# Patient Record
Sex: MALE | Race: BLACK OR AFRICAN AMERICAN | Employment: UNEMPLOYED
[De-identification: names, ages, dates, MRNs, and addresses within clinical notes are randomized per-mention and may not be internally consistent; named-entity substitution may affect disease eponyms.]

---

## 2024-08-31 ENCOUNTER — HOSPITAL ENCOUNTER (EMERGENCY)
Facility: HOSPITAL | Age: 39
Discharge: HOME | End: 2024-09-01
Payer: COMMERCIAL

## 2024-08-31 DIAGNOSIS — R23.4 CRACKED SKIN ON FEET: ICD-10-CM

## 2024-08-31 DIAGNOSIS — R36.9 PENILE DISCHARGE: ICD-10-CM

## 2024-08-31 DIAGNOSIS — Z20.2 POSSIBLE EXPOSURE TO STD: Primary | ICD-10-CM

## 2024-08-31 PROCEDURE — 87661 TRICHOMONAS VAGINALIS AMPLIF: CPT

## 2024-08-31 PROCEDURE — 99284 EMERGENCY DEPT VISIT MOD MDM: CPT

## 2024-08-31 PROCEDURE — 87491 CHLMYD TRACH DNA AMP PROBE: CPT

## 2024-08-31 PROCEDURE — 87591 N.GONORRHOEAE DNA AMP PROB: CPT

## 2024-08-31 ASSESSMENT — PAIN DESCRIPTION - LOCATION: LOCATION: FOOT

## 2024-08-31 ASSESSMENT — PAIN - FUNCTIONAL ASSESSMENT: PAIN_FUNCTIONAL_ASSESSMENT: 0-10

## 2024-08-31 ASSESSMENT — PAIN SCALES - GENERAL: PAINLEVEL_OUTOF10: 10

## 2024-08-31 ASSESSMENT — PAIN DESCRIPTION - ORIENTATION: ORIENTATION: LEFT

## 2024-09-01 VITALS
RESPIRATION RATE: 18 BRPM | TEMPERATURE: 97.7 F | HEIGHT: 71 IN | SYSTOLIC BLOOD PRESSURE: 126 MMHG | OXYGEN SATURATION: 99 % | HEART RATE: 83 BPM | DIASTOLIC BLOOD PRESSURE: 88 MMHG | WEIGHT: 142 LBS | BODY MASS INDEX: 19.88 KG/M2

## 2024-09-01 PROCEDURE — 96372 THER/PROPH/DIAG INJ SC/IM: CPT

## 2024-09-01 PROCEDURE — 6360000002 HC RX W HCPCS: Performed by: HEALTH CARE PROVIDER

## 2024-09-01 PROCEDURE — 2500000003 HC RX 250 WO HCPCS: Performed by: HEALTH CARE PROVIDER

## 2024-09-01 PROCEDURE — 6370000000 HC RX 637 (ALT 250 FOR IP): Performed by: HEALTH CARE PROVIDER

## 2024-09-01 RX ORDER — DOXYCYCLINE HYCLATE 100 MG
100 TABLET ORAL 2 TIMES DAILY
Qty: 14 TABLET | Refills: 0 | Status: SHIPPED | OUTPATIENT
Start: 2024-09-01 | End: 2024-09-08

## 2024-09-01 RX ORDER — METRONIDAZOLE 500 MG/1
2000 TABLET ORAL
Status: COMPLETED | OUTPATIENT
Start: 2024-09-01 | End: 2024-09-01

## 2024-09-01 RX ADMIN — METRONIDAZOLE 2000 MG: 500 TABLET ORAL at 00:37

## 2024-09-01 RX ADMIN — LIDOCAINE HYDROCHLORIDE 500 MG: 10 INJECTION, SOLUTION EPIDURAL; INFILTRATION; INTRACAUDAL; PERINEURAL at 00:38

## 2024-09-01 NOTE — ED TRIAGE NOTES
Pt arrived via Triage ambulatory c/o left foot pain between the 4th and 5th digit. Pt thinks it may either be athletes foot or chlamydia.

## 2024-09-01 NOTE — DISCHARGE INSTRUCTIONS
You were screened for exposure to a possible STD.  No sexual activity for the next 2 weeks.  ALL sexual partner(s) should be checked for STD's as well. You should follow-up with the [facility] Department for any further testing for STDs, including HIV, hepatitis, syphilis, and/or herpes as indicated. See contact info below.    Check your [employer] account for lab results on Gonorrhea and Chlamydia tests collected today.     [facility]  Sexually Transmitted Infections (STI) Screening and Treatment Clinic  Free confidential information and treatment of sexually transmitted infections. No appointment necessary, and there are no residency restrictions.    Phone [phone] extension [phone]    STI Hours  8:00AM to 11:00AM on Mon, Tue, Wed & Fri (no Thursday morning clinics)  12:30PM to 3:00PM Mon, Tues, Wed, Thu & Fri  STI Telehealth Hours  3:00PM to 3:45pm Mon, Tue, Wed, Thu & Fri    [address]  [place] [address]

## 2024-09-01 NOTE — ED NOTES
Pt allergies verified pt medicated per MAR, pt tolerating Po intake w/o incident. IM injection tolerated well

## 2024-09-01 NOTE — ED PROVIDER NOTES
EMERGENCY DEPARTMENT HISTORY AND PHYSICAL EXAM        Date: 8/31/2024  Patient Name: Oleg Velasquez    History of Presenting Illness     Chief Complaint   Patient presents with    Toe Pain       History Provided By: History obtained from patient    HPI: Oleg Velasquez, 38 y.o. male presents to the ED with cc of left foot cracked skin, possible exposure to STD    Patient states that within the last year he was homeless and spent a lot of time on his feet with poor hygiene.  He developed cracked skin between the fourth and fifth digits of his left foot.  He denies any erythema or purulent drainage from the site but endorses pain here when walking.  Denies any history of trauma no numbness tingling paralysis or paresthesia.  Patient states that he has had sexual contact with a woman who had chlamydia but is unsure of the timing of her infection and his symptom of penile discharge which has been present for several months.  Patient just reports of a small amount of discharge during the day that stains his underwear.  He denies any dysuria or urinary frequency, no penile lesions or inguinal lymphadenopathy.  Denies any rash or fever.    No nausea, vomiting, diarrhea, fever, chills, chest pain, shortness of breath, leg swelling     There are no other complaints, changes, or physical findings at this time.    Records Reviewed: na    PCP: No primary care provider on file.    No current facility-administered medications on file prior to encounter.     No current outpatient medications on file prior to encounter.           Past History     Past Medical History:  No past medical history on file.    Past Surgical History:  No past surgical history on file.    Family History:  No family history on file.    Social History:       Allergies:  No Known Allergies      Review of Systems   Review of Systems   Constitutional:  Negative for activity change.   Respiratory:  Negative for shortness of breath.    Cardiovascular:  Negative

## 2024-09-03 LAB
C TRACH RRNA SPEC QL NAA+PROBE: NEGATIVE
N GONORRHOEA RRNA SPEC QL NAA+PROBE: NEGATIVE
SPECIMEN SOURCE: NORMAL
T VAGINALIS RRNA SPEC QL NAA+PROBE: NEGATIVE